# Patient Record
Sex: MALE | Race: WHITE | Employment: FULL TIME | URBAN - METROPOLITAN AREA
[De-identification: names, ages, dates, MRNs, and addresses within clinical notes are randomized per-mention and may not be internally consistent; named-entity substitution may affect disease eponyms.]

---

## 2017-10-09 ENCOUNTER — HOSPITAL ENCOUNTER (OUTPATIENT)
Dept: PREADMISSION TESTING | Age: 62
Discharge: HOME | End: 2017-10-09
Payer: COMMERCIAL

## 2017-10-09 LAB
ANION GAP SERPL CALC-SCNC: 6 MMOL/L (ref 3–18)
BUN SERPL-MCNC: 10 MG/DL (ref 7–18)
BUN/CREAT SERPL: 9 (ref 12–20)
CALCIUM SERPL-MCNC: 8.6 MG/DL (ref 8.5–10.1)
CHLORIDE SERPL-SCNC: 107 MMOL/L (ref 100–108)
CO2 SERPL-SCNC: 28 MMOL/L (ref 21–32)
CREAT SERPL-MCNC: 1.13 MG/DL (ref 0.6–1.3)
GLUCOSE SERPL-MCNC: 104 MG/DL (ref 74–99)
HCT VFR BLD AUTO: 43.5 % (ref 36–48)
HGB BLD-MCNC: 14.5 G/DL (ref 13–16)
POTASSIUM SERPL-SCNC: 3.7 MMOL/L (ref 3.5–5.5)
SODIUM SERPL-SCNC: 141 MMOL/L (ref 136–145)

## 2017-10-09 PROCEDURE — 85018 HEMOGLOBIN: CPT | Performed by: INTERNAL MEDICINE

## 2017-10-09 PROCEDURE — 36415 COLL VENOUS BLD VENIPUNCTURE: CPT | Performed by: INTERNAL MEDICINE

## 2017-10-09 PROCEDURE — 80048 BASIC METABOLIC PNL TOTAL CA: CPT | Performed by: INTERNAL MEDICINE

## 2017-10-11 RX ORDER — ATROPINE SULFATE 0.1 MG/ML
0.5 INJECTION INTRAVENOUS
Status: CANCELLED | OUTPATIENT
Start: 2017-10-11 | End: 2017-10-11

## 2017-10-11 RX ORDER — SODIUM CHLORIDE 0.9 % (FLUSH) 0.9 %
5-10 SYRINGE (ML) INJECTION EVERY 8 HOURS
Status: CANCELLED | OUTPATIENT
Start: 2017-10-11 | End: 2017-10-11

## 2017-10-11 RX ORDER — EPINEPHRINE 0.1 MG/ML
1 INJECTION INTRACARDIAC; INTRAVENOUS
Status: CANCELLED | OUTPATIENT
Start: 2017-10-11 | End: 2017-10-11

## 2017-10-11 RX ORDER — SODIUM CHLORIDE 0.9 % (FLUSH) 0.9 %
5-10 SYRINGE (ML) INJECTION AS NEEDED
Status: CANCELLED | OUTPATIENT
Start: 2017-10-11 | End: 2017-10-11

## 2017-10-13 ENCOUNTER — HOSPITAL ENCOUNTER (OUTPATIENT)
Age: 62
Setting detail: OUTPATIENT SURGERY
Discharge: HOME | End: 2017-10-13
Attending: SURGERY | Admitting: SURGERY
Payer: COMMERCIAL

## 2017-10-13 VITALS
DIASTOLIC BLOOD PRESSURE: 83 MMHG | HEIGHT: 70 IN | TEMPERATURE: 96.5 F | WEIGHT: 247 LBS | RESPIRATION RATE: 16 BRPM | HEART RATE: 71 BPM | SYSTOLIC BLOOD PRESSURE: 143 MMHG | OXYGEN SATURATION: 94 % | BODY MASS INDEX: 35.36 KG/M2

## 2017-10-13 PROCEDURE — 77030020256 HC SOL INJ NACL 0.9%  500ML: Performed by: SURGERY

## 2017-10-13 PROCEDURE — 74011250636 HC RX REV CODE- 250/636

## 2017-10-13 PROCEDURE — 88305 TISSUE EXAM BY PATHOLOGIST: CPT | Performed by: SURGERY

## 2017-10-13 PROCEDURE — 76040000019: Performed by: SURGERY

## 2017-10-13 PROCEDURE — 77030013991 HC SNR POLYP ENDOSC BSC -A: Performed by: SURGERY

## 2017-10-13 PROCEDURE — G0500 MOD SEDAT ENDO SERVICE >5YRS: HCPCS | Performed by: SURGERY

## 2017-10-13 PROCEDURE — 74011250636 HC RX REV CODE- 250/636: Performed by: SURGERY

## 2017-10-13 RX ORDER — LIDOCAINE HYDROCHLORIDE 20 MG/ML
5 SOLUTION OROPHARYNGEAL ONCE
Status: DISCONTINUED | OUTPATIENT
Start: 2017-10-13 | End: 2017-10-13 | Stop reason: HOSPADM

## 2017-10-13 RX ORDER — FLUMAZENIL 0.1 MG/ML
0.2 INJECTION INTRAVENOUS
Status: DISCONTINUED | OUTPATIENT
Start: 2017-10-13 | End: 2017-10-13 | Stop reason: HOSPADM

## 2017-10-13 RX ORDER — SODIUM CHLORIDE 9 MG/ML
125 INJECTION, SOLUTION INTRAVENOUS CONTINUOUS
Status: DISCONTINUED | OUTPATIENT
Start: 2017-10-13 | End: 2017-10-13 | Stop reason: HOSPADM

## 2017-10-13 RX ORDER — FENTANYL CITRATE 50 UG/ML
100 INJECTION, SOLUTION INTRAMUSCULAR; INTRAVENOUS
Status: DISCONTINUED | OUTPATIENT
Start: 2017-10-13 | End: 2017-10-13 | Stop reason: HOSPADM

## 2017-10-13 RX ORDER — MIDAZOLAM HYDROCHLORIDE 1 MG/ML
5 INJECTION, SOLUTION INTRAMUSCULAR; INTRAVENOUS
Status: DISCONTINUED | OUTPATIENT
Start: 2017-10-13 | End: 2017-10-13 | Stop reason: HOSPADM

## 2017-10-13 RX ORDER — DEXTROMETHORPHAN/PSEUDOEPHED 2.5-7.5/.8
1.2 DROPS ORAL
Status: DISCONTINUED | OUTPATIENT
Start: 2017-10-13 | End: 2017-10-13 | Stop reason: HOSPADM

## 2017-10-13 RX ORDER — NALOXONE HYDROCHLORIDE 0.4 MG/ML
0.4 INJECTION, SOLUTION INTRAMUSCULAR; INTRAVENOUS; SUBCUTANEOUS
Status: DISCONTINUED | OUTPATIENT
Start: 2017-10-13 | End: 2017-10-13 | Stop reason: HOSPADM

## 2017-10-13 RX ADMIN — SODIUM CHLORIDE 125 ML/HR: 900 INJECTION, SOLUTION INTRAVENOUS at 11:21

## 2017-10-13 NOTE — DISCHARGE INSTRUCTIONS
Joe Vazquez  [id number]  1955    COLON DISCHARGE INSTRUCTIONS    Discomfort:  Redness at IV site- apply warm compress to area; if redness or soreness persist- contact your physician  There may be a slight amount of blood passed from the rectum  Gaseous discomfort- walking, belching will help relieve any discomfort  You should not operate a vehicle for 12 hours  You should not engage in an occupation involving machinery or appliances for rest of today  You may not drink alcoholic beverages for at least 12 hours  Avoid making any critical decisions for at least 24 hour  DIET:   High fiber diet. - however -  remember your colon is empty and a heavy meal will produce gas. Avoid these foods:  vegetables, fried / greasy foods, carbonated drinks for today     ACTIVITY:  You may resume your normal daily activities it is recommended that you spend the remainder of the day resting -  avoid any strenuous activity. CALL M.D. ANY SIGN OF:   Increasing pain, nausea, vomiting  Abdominal distension (swelling)  New increased bleeding (oral or rectal)  Fever (chills)  Pain in chest area  Bloody discharge from nose or mouth  Shortness of breath      Follow-up Instructions:   Dr Merlene Smith will call you in one to two weeks. If you do not hear from him, please call his office [phone].                           Joe Vazquez  [id number]  1955        DISCHARGE SUMMARY from Nurse    The following personal items collected during your admission are returned to you:   Dental Appliance: Dental Appliances: None  Vision: Visual Aid: Glasses  Hearing Aid:    Jewelry:    Clothing:    Other Valuables:    Valuables sent to safe:        DISCHARGE SUMMARY from Nurse    The following personal items are in your possession at time of discharge:    Dental Appliances: None  Visual Aid: Glasses                            PATIENT INSTRUCTIONS:    After general anesthesia or intravenous sedation, for 24 hours or while taking prescription Narcotics:  · Limit your activities  · Do not drive and operate hazardous machinery  · Do not make important personal or business decisions  · Do  not drink alcoholic beverages  · If you have not urinated within 8 hours after discharge, please contact your surgeon on call. Report the following to your surgeon:  · Excessive pain, swelling, redness or odor of or around the surgical area  · Temperature over 100.5  · Nausea and vomiting lasting longer than 4 hours or if unable to take medications  · Any signs of decreased circulation or nerve impairment to extremity: change in color, persistent  numbness, tingling, coldness or increase pain  · Any questions        What to do at Home:  Recommended activity: as above     If you experience any of the following symptoms as above, please follow up with Dr. Luke Johnson. *  Please give a list of your current medications to your Primary Care Provider. *  Please update this list whenever your medications are discontinued, doses are      changed, or new medications (including over-the-counter products) are added. *  Please carry medication information at all times in case of emergency situations. These are general instructions for a healthy lifestyle:    No smoking/ No tobacco products/ Avoid exposure to second hand smoke    Surgeon General's Warning:  Quitting smoking now greatly reduces serious risk to your health. Obesity, smoking, and sedentary lifestyle greatly increases your risk for illness    A healthy diet, regular physical exercise & weight monitoring are important for maintaining a healthy lifestyle    You may be retaining fluid if you have a history of heart failure or if you experience any of the following symptoms:  Weight gain of 3 pounds or more overnight or 5 pounds in a week, increased swelling in our hands or feet or shortness of breath while lying flat in bed.   Please call your doctor as soon as you notice any of these symptoms; do not wait until your next office visit. Recognize signs and symptoms of STROKE:    F-face looks uneven    A-arms unable to move or move unevenly    S-speech slurred or non-existent    T-time-call 911 as soon as signs and symptoms begin-DO NOT go       Back to bed or wait to see if you get better-TIME IS BRAIN. Warning Signs of HEART ATTACK     Call 911 if you have these symptoms:   Chest discomfort. Most heart attacks involve discomfort in the center of the chest that lasts more than a few minutes, or that goes away and comes back. It can feel like uncomfortable pressure, squeezing, fullness, or pain.  Discomfort in other areas of the upper body. Symptoms can include pain or discomfort in one or both arms, the back, neck, jaw, or stomach.  Shortness of breath with or without chest discomfort.  Other signs may include breaking out in a cold sweat, nausea, or lightheadedness. Don't wait more than five minutes to call 911 - MINUTES MATTER! Fast action can save your life. Calling 911 is almost always the fastest way to get lifesaving treatment. Emergency Medical Services staff can begin treatment when they arrive -- up to an hour sooner than if someone gets to the hospital by car. The discharge information has been reviewed with the patient and spouse. The patient and spouse verbalized understanding. Discharge medications reviewed with the patient and spouse and appropriate educational materials and side effects teaching were provided.     Patient armband removed and shredded

## 2017-10-13 NOTE — IP AVS SNAPSHOT
Monicataylorne Paramjit 
 
 
 [address] [address] 
[phone] Patient: Bindu Guzman 
MRN: [id number] DSX:[id number]/11/3492 You are allergic to the following No active allergies Recent Documentation Height Weight BMI Smoking Status 1.778 m 112 kg 35.44 kg/m2 Never Smoker Emergency Contacts Name Discharge Info Relation Home Work Mobile [phone] CAREGIVER [3] Spouse [3] [phone] About your hospitalization You were admitted on:  October 13, 2017 You last received care in the:  [facility] ENDOSCOPY You were discharged on:  October 13, 2017 Unit phone number:  [phone] Why you were hospitalized Your primary diagnosis was:  Not on File Providers Seen During Your Hospitalizations Provider Role Specialty Primary office phone Vicenta Cheney MD Attending Provider General Surgery [phone] Your Primary Care Physician (PCP) Primary Care Physician Office Phone Office Fax Evie Elton [phone] Follow-up Information Follow up With Details Comments Contact Info Pricilla Mckenna, [address] [facility] [address] [place] [address] 
[phone] Your Appointments Wednesday October 25, 2017 [facility] LAPAROSCOP[facility] with Vicenta Cheney MD  
[facility] SURGERY [facility]) [address] [address] [phone] Current Discharge Medication List  
  
CONTINUE these medications which have NOT CHANGED Dose & Instructions Dispensing Information Comments Morning Noon Evening Bedtime  
 aspirin delayed-release 81 mg tablet Your last dose was: Your next dose is:    
   
   
 Dose:  81 mg Take 81 mg by mouth every evening. Refills:  0 PLAVIX 75 mg Tab Generic drug:  clopidogrel Your last dose was: Your next dose is:    
   
   
 Dose:  75 mg Take 75 mg by mouth every evening. Refills:  0  
     
   
   
   
  
 ramipril 5 mg capsule Commonly known as:  ALTACE Your last dose was: Your next dose is:    
   
   
 Dose:  5 mg Take 5 mg by mouth every evening. Refills:  0 ZOCOR 40 mg tablet Generic drug:  simvastatin Your last dose was: Your next dose is:    
   
   
 Dose:  40 mg Take 40 mg by mouth nightly. Refills:  0 Discharge Instructions Balbir Gerardo 
[id number] 
1955 COLON DISCHARGE INSTRUCTIONS Discomfort: 
Redness at IV site- apply warm compress to area; if redness or soreness persist- contact your physician There may be a slight amount of blood passed from the rectum Gaseous discomfort- walking, belching will help relieve any discomfort You should not operate a vehicle for 12 hours You should not engage in an occupation involving machinery or appliances for rest of today You may not drink alcoholic beverages for at least 12 hours Avoid making any critical decisions for at least 24 hour DIET: 
 High fiber diet.  however -  remember your colon is empty and a heavy meal will produce gas. Avoid these foods:  vegetables, fried / greasy foods, carbonated drinks for today ACTIVITY: 
You may resume your normal daily activities it is recommended that you spend the remainder of the day resting -  avoid any strenuous activity. CALL M.D. ANY SIGN OF: Increasing pain, nausea, vomiting Abdominal distension (swelling) New increased bleeding (oral or rectal) Fever (chills) Pain in chest area Bloody discharge from nose or mouth Shortness of breath Follow-up Instructions: 
 Dr Sofía Bowman will call you in one to two weeks. If you do not hear from him, please call his office [phone]. Balbir Gerardo 
[id number] 1955 DISCHARGE SUMMARY from Nurse The following personal items collected during your admission are returned to you:  
Dental Appliance: Dental Appliances: None Vision: Visual Aid: Glasses Hearing Aid:   
Jewelry:   
Clothing:   
Other Valuables:   
Valuables sent to safe:   
 
 
DISCHARGE SUMMARY from Nurse The following personal items are in your possession at time of discharge: 
 
Dental Appliances: None Visual Aid: Glasses PATIENT INSTRUCTIONS: 
 
 
F-face looks uneven A-arms unable to move or move unevenly S-speech slurred or non-existent T-time-call 911 as soon as signs and symptoms begin-DO NOT go Back to bed or wait to see if you get better-TIME IS BRAIN. Warning Signs of HEART ATTACK Call 911 if you have these symptoms: 
? Chest discomfort. Most heart attacks involve discomfort in the center of the chest that lasts more than a few minutes, or that goes away and comes back. It can feel like uncomfortable pressure, squeezing, fullness, or pain. ? Discomfort in other areas of the upper body. Symptoms can include pain or discomfort in one or both arms, the back, neck, jaw, or stomach. ? Shortness of breath with or without chest discomfort. ? Other signs may include breaking out in a cold sweat, nausea, or lightheadedness. Don't wait more than five minutes to call [address]! Fast action can save your life. Calling 911 is almost always the fastest way to get lifesaving treatment. Emergency Medical Services staff can begin treatment when they arrive  up to an hour sooner than if someone gets to the hospital by car. The discharge information has been reviewed with the patient and spouse. The patient and spouse verbalized understanding. Discharge medications reviewed with the patient and spouse and appropriate educational materials and side effects teaching were provided. Patient armband removed and shredded Discharge Orders None Introducing [facility]! Navarro Mcintyre introduces [employer] patient portal. Now you can access parts of your medical record, email your doctor's office, and request medication refills online. 1. In your internet browser, go to [email or web address] 2. Click on the First Time User? Click Here link in the Sign In box. You will see the New Member Sign Up page. 3. Enter your [employer] Access Code exactly as it appears below. You will not need to use this code after youve completed the sign-up process. If you do not sign up before the expiration date, you must request a new code. · [employer] Access Code: [email or web address] Expires: 12/25/2017 12:17 PM 
 
4. Enter the last four digits of your Social Security Number (xxxx) and Date of Birth (mm/dd/yyyy) as indicated and click Submit. You will be taken to the next sign-up page. 5. Create a [employer] ID. This will be your [employer] login ID and cannot be changed, so think of one that is secure and easy to remember. 6. Create a [employer] password. You can change your password at any time. 7. Enter your Password Reset Question and Answer. This can be used at a later time if you forget your password. 8. Enter your e-mail address. You will receive e-mail notification when new information is available in [address]. 9. Click Sign Up. You can now view and download portions of your medical record. 10. Click the Download Summary menu link to download a portable copy of your medical information. If you have questions, please visit the Frequently Asked Questions section of the [employer] website. Remember, [employer] is NOT to be used for urgent needs. For medical emergencies, dial 911. Now available from your iPhone and Android! General Information Please provide this summary of care documentation to your next provider. Patient Signature:  ____________________________________________________________ Date:  ____________________________________________________________  
  
Conner Mais Provider Signature:  ____________________________________________________________ Date:  ____________________________________________________________

## 2017-10-13 NOTE — H&P
Assessment/Plan  # Detail Type Description    1. Assessment Abdominal pain (R10.9). Patient Plan Pain could be relate dto umbilical hernia. But he has tenderness in LLQ also. I am concerned about diverticultits. He will need a CT. Discussed cscope, which he never has had. His father had colon CA. 2. Assessment Umbilical hernia without obstruction and without gangrene (K42.9). Patient Plan Discussed. I have discussed the risks benefits and alternatives of the procedure to the patient including bleeding, infection, use of mesh, post op pain, reason and side effects of nerve resections,recurrence. They understand and wish to proceed after CT. 3. Assessment Encounter for screening for malignant neoplasm of colon (Z12.11). Patient Plan  I have discussed the risks, benefits and alternatives of the procedure to the patient including bleeding, infection, bowel prep, perforation missed lesions and incomplete procedure. They understand and wish to proceed. 4. Other Orders Orders not associated to today's assessments. Plan Orders BUN AU to be performed and Creatinine, Serum AU to be performed. Plan Orders Further diagnostic evaluations ordered today include(s) CT Abdomen And Pelvis W/ DYE to be performed. This 58year old male presents for Hernia, Abdominal Discomfort and Colon Cancer Screen. History of Present Illness:  1. Hernia      Duration: day. Severity: 8. The problem is improving., LLQ and periumbilical  The patient describes it as dull and sharp. Identified risk factors include history of hernias, obesity, overweight and smoking. Symptom is aggravated by pressure to abdomen.   Pertinent negatives include abdominal distention, anorexia, back pain, bloating, blood in stool, constipation, cough, diaphoresis, diarrhea, dizziness, dyspnea, epigastric pain, eructation, fatigue, fever, flank pain, flatulence, heartburn, hematuria, jaundice, lightheadedness, menstruation, milk/dairy intolerance, myalgia, nausea, post prandial fullness, reflux, scrotal swelling, vomiting, weight gain and weight loss. Additional information: Known umbilical hernia, now with pain since Sat. Also has pain in LLQ, no previous cscope and has FH colon CA. .      2.  Abdominal Discomfort      Additional information: LLQ pain. No previous cscope. 3.  Colon Cancer Screen      No prior screening. Risk Factors: family history - parent and Father. Associated symptoms include abdominal pain. Pertinent negatives include change in bowel habits, change in stool caliber, constipation, decreased appetite, diarrhea, melena, nausea, rectal bleeding, vomiting, weight gain and weight loss. Additional information: No family history of Crohn's/colitis and Patient has family history of colon cancer. PROBLEM LIST:  Problem Description Onset Date   Hypogonadism 2015   Hemorrhoids 2015   Hyperlipidemia 2014   Coronary atherosclerosis 2014   Benign essential hypertension 2014       PAST MEDICAL/SURGICAL HISTORY  (Detailed)    Disease/disorder Onset Date Management Date Comments     cardiac catheterization       oral surgery       Family History:  (Detailed)  Relationship Family Member Name  Age at Death Condition Onset Age Cause of Death       Family history of Asthma  N       Family history of Diabetes mellitus type 2  N   Father    Cancer, colon  N   Mother    Blood clots, lung  N       Social History:  (Detailed)  Tobacco use reviewed. Preferred language is [place]. MARITAL STATUS/FAMILY/SOCIAL SUPPORT  Currently . Tobacco use status: Never smoked tobacco.  Smoking status: Never smoker. SMOKING STATUS  Use Status Type Smoking Status Usage Per Day Years Used Total Pack Years   no/never  Never smoker          ALCOHOL  There is no history of alcohol use. CAFFEINE  The patient uses caffeine: tea and coffee.  - 48oz a day.        Medication Reconciliation  Medications reconciled today. Medication Reviewed  Adherence Medication Name Sig Desc Elsewhere Status   taking as directed Zocor 40 mg tablet TAKE ONE TABLET BY MOUTH ONCE DAILY IN THE EVENING in the evening N Verified   taking as directed Altace 5 mg capsule TAKE ONE CAPSULE BY MOUTH ONCE DAILY N Verified   taking as directed Plavix 75 mg tablet TAKE ONE TABLET BY MOUTH ONCE DAILY N Verified   taking as directed aspirin 81 mg tablet,delayed release take 1 tablet by oral route  every day N Verified     Allergies:  Ingredient Reaction Medication Name Comment   NO KNOWN DRUG ALLERGIES      (Reviewed, no changes.)  Review of Systems  System Neg/Pos Details   Constitutional Negative Fatigue, fever, night sweats, weight gain and weight loss. ENMT Negative Hearing loss, tinnitus, vertigo and voice change. Eyes Negative Diplopia and vision loss. Respiratory Negative Asthma, cough, dyspnea, hemoptysis, known TB exposure and wheezing. Cardio Negative Chest pain, claudication, edema, irregular heartbeat/palpitations and thrombophlebitis. GI Positive Abdominal pain. GI Negative Abdominal distention, anorexia, bloating, blood in stool, change in bowel habits, change in stool caliber, constipation, decreased appetite, diarrhea, dysphagia, epigastric pain, eructation, flatulence, heartburn, hemorrhoids, jaundice, melena, milk/diary intolerance, nausea, post prandial fullness, rectal bleeding, reflux and vomiting.  Negative Back pain, dysuria, flank pain, hematuria, menstruation, nocturia, passage stone/gravel, scrotal swelling and urinary incontinence. Endocrine Negative Cold intolerance, diaphoresis and goiter. Neuro Negative Dizziness, focal weakness, headache, lightheadedness, paresthesia, seizures and syncope. Integumentary Negative Change in shape/size of mole(s) and skin lesion. MS Negative Back pain, bone/joint symptoms, muscle weakness and myalgia. Hema/Lymph Negative Easy bleeding and easy bruising. Allergic/Immuno Negative Contact allergy and contact dermatitis. Vital Signs     Height  Time ft in cm Last Measured Height Position   8:54 AM 0.0 70.50 179.07 09/12/2017 0     Weight/BSA/BMI  Time lb oz kg Context BMI kg/m2 BSA m2   8:54 .00  115.212 dressed with shoes 35.93      Blood Pressure  Time BP mm/Hg Position Side Site Method Cuff Size   8:54 /80 sitting right arm automatic adult large     Temperature/Pulse/Respiration  Time Temp F Temp C Temp Site Pulse/min Pattern Resp/ min   8:54 AM 99.70 37.61 ear 63       Measured By  Time Measured by   8:54 AM Ne Givens       Physical Exam:  Exam Findings Details   Constitutional Normal Well developed. Eyes Normal Conjunctiva - Right: Normal, Left: Normal. Pupil - Right: Normal, Left: Normal.   Ears Normal Inspection - Right: Normal, Left: Normal. Hearing - Right: Normal, Left: Normal.   Nose/Mouth/Throat Normal External nose - Normal. Lips/teeth/gums - Normal. Oropharynx - Normal.   Neck Exam Normal Inspection - Normal. Palpation - Normal. Thyroid gland - Normal.   Lymph Detail Normal No cervical, supraclavicular, or axillary adenopathy. Respiratory Normal Inspection - Normal. Auscultation - Normal. Effort - Normal.   Cardiovascular Normal Regular rate and rhythm. No murmurs, gallops, or rubs. Vascular Normal Pulses - Dorsalis pedis: Normal. Capillary refill - Less than 2 seconds. Abdomen * Umbilicus - herniated. Abdominal tenderness - periumbilical, LLQ. Abdomen Normal No hepatic enlargement. No spleen enlargement. No ascites. No palpable mass. Genitourinary Normal No hernia. Skin Normal Inspection - Normal.   Musculoskeletal Normal Visual overview of all four extremities is normal.   Extremity Normal No edema. Neurological Normal Memory - Normal. Cranial nerves - Cranial nerves II through XII grossly intact.    Psychiatric Normal Orientation - Oriented to time, place, person & situation. Appropriate mood and affect. Normal insight. Normal judgment. Medications (added, continued, or stopped this visit):  Started Medication Directions Instruction Stopped   06/08/2017 Altace 5 mg capsule TAKE ONE CAPSULE BY MOUTH ONCE DAILY     06/08/2017 aspirin 81 mg tablet,delayed release take 1 tablet by oral route  every day     06/08/2017 Plavix 75 mg tablet TAKE ONE TABLET BY MOUTH ONCE DAILY     06/08/2017 Zocor 40 mg tablet TAKE ONE TABLET BY MOUTH ONCE DAILY IN THE EVENING in the evening     To Be Scheduled / Ordered:  Status Order Reason Assessment Timeframe Appointment   ordered Creatinine, Serum AU  I10     ordered BUN AU  I10     ordered CT Abdomen And Pelvis W/ DYE  R10.32     Completed by: Lorenza Woodard 09/12/2017 9:16 AM   Document generated by:  Matthias Burch 09/12/2017 09:16 AM     -----------------------------------------------------------------------------------------------------------

## 2017-10-13 NOTE — OP NOTES
Colonoscopy Procedure Note    Indications: Routine screening/FH    Anesthesia/Sedation: Versed /fent mg IV    Pre-Procedure Exam:  Airway: clear   Heart: normal S1and S2    Lungs: clear bilateral  Abdomen: soft, nontender, bowel sounds present and normal in all quadrants   Mental Status: awake, alert, and oriented to person, place, and time      Procedure in Detail:  Informed consent was obtained for the procedure, including sedation. Risks of perforation, hemorrhage, adverse drug reaction, and aspiration were discussed. The patient was placed in the left lateral decubitus position. Based on the pre-procedure assessment, including review of the patient's medical history, medications, allergies, and review of systems, he had been deemed to be an appropriate candidate for moderate sedation; he was therefore sedated with the medications listed above. The patient was monitored continuously with ECG tracing, pulse oximetry, blood pressure monitoring, and direct observations. A rectal examination was performed. The [id number] was inserted into the rectum and advanced under direct vision to the cecum, which was identified by the ileocecal valve and appendiceal orifice. The quality of the colonic preparation was good. A careful inspection was made as the colonoscope was withdrawn, including a retroflexed view of the rectum; findings and interventions are described below. Appropriate photodocumentation was obtained. Findings:   Rectum:   Normal  Sigmoid:     - Protruding lesions:     -Sessile Polyp(s) size 2 mm removed by polypectomy (snare cautery)/40 cm  Descending Colon:     - Excavated lesions:     - Diverticulosis  Transverse Colon:   Normal  Ascending Colon:   Normal  Cecum:   Normal  Terminal Ileum:   Not entered        Specimens: Specimens were collected and sent to pathology. EBL: Minimal    Complications: None; patient tolerated the procedure well.     Attending Attestation: I performed the procedure. Recommendations:   - Await pathology.     Signed By: Alek Katz MD                       10/13/2017

## 2017-10-13 NOTE — INTERVAL H&P NOTE
H&P Update:  Alesia Gar was seen and examined. History and physical has been reviewed. The patient has been examined.  There have been no significant clinical changes since the completion of the originally dated History and Physical.    Signed By: Carlo Wadsworth MD     October 13, 2017 12:22 PM

## 2017-10-24 NOTE — H&P
Assessment/Plan  # Detail Type Description    1. Assessment Abdominal pain (R10.9). Patient Plan Pain could be relate dto umbilical hernia. But he has tenderness in LLQ also. I am concerned about diverticultits. He will need a CT. Discussed cscope, which he never has had. His father had colon CA. 2. Assessment Umbilical hernia without obstruction and without gangrene (K42.9). Patient Plan Discussed. I have discussed the risks benefits and alternatives of the procedure to the patient including bleeding, infection, use of mesh, post op pain, reason and side effects of nerve resections,recurrence. They understand and wish to proceed after CT. 3. Assessment Encounter for screening for malignant neoplasm of colon (Z12.11). Patient Plan  I have discussed the risks, benefits and alternatives of the procedure to the patient including bleeding, infection, bowel prep, perforation missed lesions and incomplete procedure. They understand and wish to proceed. 4. Other Orders Orders not associated to today's assessments. Plan Orders BUN AU to be performed and Creatinine, Serum AU to be performed. Plan Orders Further diagnostic evaluations ordered today include(s) CT Abdomen And Pelvis W/ DYE to be performed. This 58year old male presents for Hernia, Abdominal Discomfort and Colon Cancer Screen. History of Present Illness:  1. Hernia      Duration: day. Severity: 8. The problem is improving., LLQ and periumbilical  The patient describes it as dull and sharp. Identified risk factors include history of hernias, obesity, overweight and smoking. Symptom is aggravated by pressure to abdomen.   Pertinent negatives include abdominal distention, anorexia, back pain, bloating, blood in stool, constipation, cough, diaphoresis, diarrhea, dizziness, dyspnea, epigastric pain, eructation, fatigue, fever, flank pain, flatulence, heartburn, hematuria, jaundice, lightheadedness, menstruation, milk/dairy intolerance, myalgia, nausea, post prandial fullness, reflux, scrotal swelling, vomiting, weight gain and weight loss. Additional information: Known umbilical hernia, now with pain since Sat. Also has pain in LLQ, no previous cscope and has FH colon CA. .      2.  Abdominal Discomfort      Additional information: LLQ pain. No previous cscope. 3.  Colon Cancer Screen      No prior screening. Risk Factors: family history - parent and Father. Associated symptoms include abdominal pain. Pertinent negatives include change in bowel habits, change in stool caliber, constipation, decreased appetite, diarrhea, melena, nausea, rectal bleeding, vomiting, weight gain and weight loss. Additional information: No family history of Crohn's/colitis and Patient has family history of colon cancer. PROBLEM LIST:  Problem Description Onset Date   Hypogonadism 2015   Hemorrhoids 2015   Hyperlipidemia 2014   Coronary atherosclerosis 2014   Benign essential hypertension 2014       PAST MEDICAL/SURGICAL HISTORY  (Detailed)    Disease/disorder Onset Date Management Date Comments     cardiac catheterization       oral surgery       Family History:  (Detailed)  Relationship Family Member Name  Age at Death Condition Onset Age Cause of Death       Family history of Asthma  N       Family history of Diabetes mellitus type 2  N   Father    Cancer, colon  N   Mother    Blood clots, lung  N       Social History:  (Detailed)  Tobacco use reviewed. Preferred language is [place]. MARITAL STATUS/FAMILY/SOCIAL SUPPORT  Currently . Tobacco use status: Never smoked tobacco.  Smoking status: Never smoker. SMOKING STATUS  Use Status Type Smoking Status Usage Per Day Years Used Total Pack Years   no/never  Never smoker          ALCOHOL  There is no history of alcohol use. CAFFEINE  The patient uses caffeine: tea and coffee.  - 48oz a day.        Medication Reconciliation  Medications reconciled today. Medication Reviewed  Adherence Medication Name Sig Desc Elsewhere Status   taking as directed Zocor 40 mg tablet TAKE ONE TABLET BY MOUTH ONCE DAILY IN THE EVENING in the evening N Verified   taking as directed Altace 5 mg capsule TAKE ONE CAPSULE BY MOUTH ONCE DAILY N Verified   taking as directed Plavix 75 mg tablet TAKE ONE TABLET BY MOUTH ONCE DAILY N Verified   taking as directed aspirin 81 mg tablet,delayed release take 1 tablet by oral route  every day N Verified     Allergies:  Ingredient Reaction Medication Name Comment   NO KNOWN DRUG ALLERGIES      (Reviewed, no changes.)  Review of Systems  System Neg/Pos Details   Constitutional Negative Fatigue, fever, night sweats, weight gain and weight loss. ENMT Negative Hearing loss, tinnitus, vertigo and voice change. Eyes Negative Diplopia and vision loss. Respiratory Negative Asthma, cough, dyspnea, hemoptysis, known TB exposure and wheezing. Cardio Negative Chest pain, claudication, edema, irregular heartbeat/palpitations and thrombophlebitis. GI Positive Abdominal pain. GI Negative Abdominal distention, anorexia, bloating, blood in stool, change in bowel habits, change in stool caliber, constipation, decreased appetite, diarrhea, dysphagia, epigastric pain, eructation, flatulence, heartburn, hemorrhoids, jaundice, melena, milk/diary intolerance, nausea, post prandial fullness, rectal bleeding, reflux and vomiting.  Negative Back pain, dysuria, flank pain, hematuria, menstruation, nocturia, passage stone/gravel, scrotal swelling and urinary incontinence. Endocrine Negative Cold intolerance, diaphoresis and goiter. Neuro Negative Dizziness, focal weakness, headache, lightheadedness, paresthesia, seizures and syncope. Integumentary Negative Change in shape/size of mole(s) and skin lesion. MS Negative Back pain, bone/joint symptoms, muscle weakness and myalgia. Hema/Lymph Negative Easy bleeding and easy bruising. Allergic/Immuno Negative Contact allergy and contact dermatitis. Vital Signs     Height  Time ft in cm Last Measured Height Position   8:54 AM 0.0 70.50 179.07 09/12/2017 0     Weight/BSA/BMI  Time lb oz kg Context BMI kg/m2 BSA m2   8:54 .00  115.212 dressed with shoes 35.93      Blood Pressure  Time BP mm/Hg Position Side Site Method Cuff Size   8:54 /80 sitting right arm automatic adult large     Temperature/Pulse/Respiration  Time Temp F Temp C Temp Site Pulse/min Pattern Resp/ min   8:54 AM 99.70 37.61 ear 63       Measured By  Time Measured by   8:54 AM Lissa Cordova       Physical Exam:  Exam Findings Details   Constitutional Normal Well developed. Eyes Normal Conjunctiva - Right: Normal, Left: Normal. Pupil - Right: Normal, Left: Normal.   Ears Normal Inspection - Right: Normal, Left: Normal. Hearing - Right: Normal, Left: Normal.   Nose/Mouth/Throat Normal External nose - Normal. Lips/teeth/gums - Normal. Oropharynx - Normal.   Neck Exam Normal Inspection - Normal. Palpation - Normal. Thyroid gland - Normal.   Lymph Detail Normal No cervical, supraclavicular, or axillary adenopathy. Respiratory Normal Inspection - Normal. Auscultation - Normal. Effort - Normal.   Cardiovascular Normal Regular rate and rhythm. No murmurs, gallops, or rubs. Vascular Normal Pulses - Dorsalis pedis: Normal. Capillary refill - Less than 2 seconds. Abdomen * Umbilicus - herniated. Abdominal tenderness - periumbilical, LLQ. Abdomen Normal No hepatic enlargement. No spleen enlargement. No ascites. No palpable mass. Genitourinary Normal No hernia. Skin Normal Inspection - Normal.   Musculoskeletal Normal Visual overview of all four extremities is normal.   Extremity Normal No edema. Neurological Normal Memory - Normal. Cranial nerves - Cranial nerves II through XII grossly intact.    Psychiatric Normal Orientation - Oriented to time, place, person & situation. Appropriate mood and affect. Normal insight. Normal judgment. Medications (added, continued, or stopped this visit):  Started Medication Directions Instruction Stopped   06/08/2017 Altace 5 mg capsule TAKE ONE CAPSULE BY MOUTH ONCE DAILY     06/08/2017 aspirin 81 mg tablet,delayed release take 1 tablet by oral route  every day     06/08/2017 Plavix 75 mg tablet TAKE ONE TABLET BY MOUTH ONCE DAILY     06/08/2017 Zocor 40 mg tablet TAKE ONE TABLET BY MOUTH ONCE DAILY IN THE EVENING in the evening     To Be Scheduled / Ordered:  Status Order Reason Assessment Timeframe Appointment   ordered Creatinine, Serum AU  I10     ordered BUN AU  I10     ordered CT Abdomen And Pelvis W/ DYE  R10.32     Completed by: Shana Pretty 09/12/2017 9:16 AM   Document generated by:  Torsten Burch 09/12/2017 09:16 AM     -----------------------------------------------------------------------------------------------------------

## 2017-10-25 ENCOUNTER — ANESTHESIA (OUTPATIENT)
Dept: SURGERY | Age: 62
End: 2017-10-25
Payer: COMMERCIAL

## 2017-10-25 ENCOUNTER — HOSPITAL ENCOUNTER (OUTPATIENT)
Age: 62
Setting detail: OUTPATIENT SURGERY
Discharge: HOME | End: 2017-10-25
Attending: SURGERY | Admitting: SURGERY
Payer: COMMERCIAL

## 2017-10-25 ENCOUNTER — ANESTHESIA EVENT (OUTPATIENT)
Dept: SURGERY | Age: 62
End: 2017-10-25
Payer: COMMERCIAL

## 2017-10-25 VITALS
OXYGEN SATURATION: 96 % | BODY MASS INDEX: 36.15 KG/M2 | TEMPERATURE: 97.5 F | WEIGHT: 252.5 LBS | HEIGHT: 70 IN | HEART RATE: 63 BPM | RESPIRATION RATE: 16 BRPM | DIASTOLIC BLOOD PRESSURE: 67 MMHG | SYSTOLIC BLOOD PRESSURE: 122 MMHG

## 2017-10-25 PROCEDURE — 77030008518 HC TBNG INSUF ENDO STRY -B: Performed by: SURGERY

## 2017-10-25 PROCEDURE — 74011250636 HC RX REV CODE- 250/636: Performed by: INTERNAL MEDICINE

## 2017-10-25 PROCEDURE — 77030033200 HC PRT CLSR CRTR THOMP COOP -C: Performed by: SURGERY

## 2017-10-25 PROCEDURE — 77030010507 HC ADH SKN DERMBND J&J -B: Performed by: SURGERY

## 2017-10-25 PROCEDURE — 77030008603 HC TRCR ENDOSC EPATH J&J -C: Performed by: SURGERY

## 2017-10-25 PROCEDURE — 77030006643: Performed by: ANESTHESIOLOGY

## 2017-10-25 PROCEDURE — 77030008683 HC TU ET CUF COVD -A: Performed by: ANESTHESIOLOGY

## 2017-10-25 PROCEDURE — 77030032230 HC DSCTR ULTSONC CRDLSS COVD -E: Performed by: SURGERY

## 2017-10-25 PROCEDURE — 74011250636 HC RX REV CODE- 250/636

## 2017-10-25 PROCEDURE — 74011000250 HC RX REV CODE- 250

## 2017-10-25 PROCEDURE — C1781 MESH (IMPLANTABLE): HCPCS | Performed by: SURGERY

## 2017-10-25 PROCEDURE — 77030016151 HC PROTCTR LNS DFOG COVD -B: Performed by: SURGERY

## 2017-10-25 PROCEDURE — 77030008477 HC STYL SATN SLP COVD -A: Performed by: ANESTHESIOLOGY

## 2017-10-25 PROCEDURE — 77030008602 HC TRCR ENDOSC EPATH J&J -B: Performed by: SURGERY

## 2017-10-25 PROCEDURE — 77030034972 HC STPLR ARTC RELTACK3 COVD -F: Performed by: SURGERY

## 2017-10-25 PROCEDURE — 76060000033 HC ANESTHESIA 1 TO 1.5 HR: Performed by: SURGERY

## 2017-10-25 PROCEDURE — 76210000021 HC REC RM PH II 0.5 TO 1 HR: Performed by: SURGERY

## 2017-10-25 PROCEDURE — 74011000250 HC RX REV CODE- 250: Performed by: SURGERY

## 2017-10-25 PROCEDURE — 76210000006 HC OR PH I REC 0.5 TO 1 HR: Performed by: SURGERY

## 2017-10-25 PROCEDURE — 77030002986 HC SUT PROL J&J -A: Performed by: SURGERY

## 2017-10-25 PROCEDURE — 76010000149 HC OR TIME 1 TO 1.5 HR: Performed by: SURGERY

## 2017-10-25 PROCEDURE — 77030002966 HC SUT PDS J&J -A: Performed by: SURGERY

## 2017-10-25 PROCEDURE — 77030032490 HC SLV COMPR SCD KNE COVD -B: Performed by: SURGERY

## 2017-10-25 PROCEDURE — 77030011265 HC ELECTRD BLD HEX COVD -A: Performed by: SURGERY

## 2017-10-25 PROCEDURE — 77030003580 HC NDL INSUF VERES J&J -B: Performed by: SURGERY

## 2017-10-25 PROCEDURE — 74011000258 HC RX REV CODE- 258: Performed by: INTERNAL MEDICINE

## 2017-10-25 PROCEDURE — 77030031139 HC SUT VCRL2 J&J -A: Performed by: SURGERY

## 2017-10-25 PROCEDURE — 77030002935 HC SUT MCRYL J&J -C: Performed by: SURGERY

## 2017-10-25 PROCEDURE — 77030020782 HC GWN BAIR PAWS FLX 3M -B: Performed by: SURGERY

## 2017-10-25 DEVICE — MESH SURG DIA12CM WHT POLY CLLGN FLM RND MFIL BIOABSRB: Type: IMPLANTABLE DEVICE | Site: ABDOMEN | Status: FUNCTIONAL

## 2017-10-25 RX ORDER — LIDOCAINE HYDROCHLORIDE 20 MG/ML
INJECTION, SOLUTION EPIDURAL; INFILTRATION; INTRACAUDAL; PERINEURAL AS NEEDED
Status: DISCONTINUED | OUTPATIENT
Start: 2017-10-25 | End: 2017-10-25 | Stop reason: HOSPADM

## 2017-10-25 RX ORDER — ONDANSETRON 2 MG/ML
4 INJECTION INTRAMUSCULAR; INTRAVENOUS ONCE
Status: DISCONTINUED | OUTPATIENT
Start: 2017-10-25 | End: 2017-10-25 | Stop reason: HOSPADM

## 2017-10-25 RX ORDER — OXYCODONE AND ACETAMINOPHEN 5; 325 MG/1; MG/1
1 TABLET ORAL AS NEEDED
Status: DISCONTINUED | OUTPATIENT
Start: 2017-10-25 | End: 2017-10-25 | Stop reason: HOSPADM

## 2017-10-25 RX ORDER — KETOROLAC TROMETHAMINE 30 MG/ML
INJECTION, SOLUTION INTRAMUSCULAR; INTRAVENOUS AS NEEDED
Status: DISCONTINUED | OUTPATIENT
Start: 2017-10-25 | End: 2017-10-25 | Stop reason: HOSPADM

## 2017-10-25 RX ORDER — INSULIN LISPRO 100 [IU]/ML
INJECTION, SOLUTION INTRAVENOUS; SUBCUTANEOUS ONCE
Status: CANCELLED | OUTPATIENT
Start: 2017-10-25 | End: 2017-10-25

## 2017-10-25 RX ORDER — OXYCODONE AND ACETAMINOPHEN 5; 325 MG/1; MG/1
1-2 TABLET ORAL
Qty: 30 TAB | Refills: 0 | Status: SHIPPED | OUTPATIENT
Start: 2017-10-25

## 2017-10-25 RX ORDER — ONDANSETRON 2 MG/ML
INJECTION INTRAMUSCULAR; INTRAVENOUS AS NEEDED
Status: DISCONTINUED | OUTPATIENT
Start: 2017-10-25 | End: 2017-10-25 | Stop reason: HOSPADM

## 2017-10-25 RX ORDER — NALOXONE HYDROCHLORIDE 0.4 MG/ML
0.1 INJECTION, SOLUTION INTRAMUSCULAR; INTRAVENOUS; SUBCUTANEOUS
Status: DISCONTINUED | OUTPATIENT
Start: 2017-10-25 | End: 2017-10-25 | Stop reason: HOSPADM

## 2017-10-25 RX ORDER — PROPOFOL 10 MG/ML
INJECTION, EMULSION INTRAVENOUS AS NEEDED
Status: DISCONTINUED | OUTPATIENT
Start: 2017-10-25 | End: 2017-10-25 | Stop reason: HOSPADM

## 2017-10-25 RX ORDER — MIDAZOLAM HYDROCHLORIDE 1 MG/ML
INJECTION, SOLUTION INTRAMUSCULAR; INTRAVENOUS AS NEEDED
Status: DISCONTINUED | OUTPATIENT
Start: 2017-10-25 | End: 2017-10-25 | Stop reason: HOSPADM

## 2017-10-25 RX ORDER — MAGNESIUM SULFATE 100 %
4 CRYSTALS MISCELLANEOUS AS NEEDED
Status: CANCELLED | OUTPATIENT
Start: 2017-10-25

## 2017-10-25 RX ORDER — FENTANYL CITRATE 50 UG/ML
25 INJECTION, SOLUTION INTRAMUSCULAR; INTRAVENOUS
Status: ACTIVE | OUTPATIENT
Start: 2017-10-25 | End: 2017-10-25

## 2017-10-25 RX ORDER — DEXAMETHASONE SODIUM PHOSPHATE 4 MG/ML
INJECTION, SOLUTION INTRA-ARTICULAR; INTRALESIONAL; INTRAMUSCULAR; INTRAVENOUS; SOFT TISSUE AS NEEDED
Status: DISCONTINUED | OUTPATIENT
Start: 2017-10-25 | End: 2017-10-25 | Stop reason: HOSPADM

## 2017-10-25 RX ORDER — DEXTROSE 50 % IN WATER (D50W) INTRAVENOUS SYRINGE
25-50 AS NEEDED
Status: CANCELLED | OUTPATIENT
Start: 2017-10-25

## 2017-10-25 RX ORDER — HYDROMORPHONE HYDROCHLORIDE 2 MG/ML
0.5 INJECTION, SOLUTION INTRAMUSCULAR; INTRAVENOUS; SUBCUTANEOUS
Status: DISCONTINUED | OUTPATIENT
Start: 2017-10-25 | End: 2017-10-25 | Stop reason: HOSPADM

## 2017-10-25 RX ORDER — SODIUM CHLORIDE, SODIUM LACTATE, POTASSIUM CHLORIDE, CALCIUM CHLORIDE 600; 310; 30; 20 MG/100ML; MG/100ML; MG/100ML; MG/100ML
125 INJECTION, SOLUTION INTRAVENOUS CONTINUOUS
Status: DISCONTINUED | OUTPATIENT
Start: 2017-10-25 | End: 2017-10-25 | Stop reason: HOSPADM

## 2017-10-25 RX ORDER — ROCURONIUM BROMIDE 10 MG/ML
INJECTION, SOLUTION INTRAVENOUS AS NEEDED
Status: DISCONTINUED | OUTPATIENT
Start: 2017-10-25 | End: 2017-10-25 | Stop reason: HOSPADM

## 2017-10-25 RX ORDER — FENTANYL CITRATE 50 UG/ML
INJECTION, SOLUTION INTRAMUSCULAR; INTRAVENOUS AS NEEDED
Status: DISCONTINUED | OUTPATIENT
Start: 2017-10-25 | End: 2017-10-25 | Stop reason: HOSPADM

## 2017-10-25 RX ORDER — SODIUM CHLORIDE, SODIUM LACTATE, POTASSIUM CHLORIDE, CALCIUM CHLORIDE 600; 310; 30; 20 MG/100ML; MG/100ML; MG/100ML; MG/100ML
50 INJECTION, SOLUTION INTRAVENOUS CONTINUOUS
Status: DISCONTINUED | OUTPATIENT
Start: 2017-10-25 | End: 2017-10-25 | Stop reason: HOSPADM

## 2017-10-25 RX ORDER — SODIUM CHLORIDE 0.9 % (FLUSH) 0.9 %
5-10 SYRINGE (ML) INJECTION AS NEEDED
Status: DISCONTINUED | OUTPATIENT
Start: 2017-10-25 | End: 2017-10-25 | Stop reason: HOSPADM

## 2017-10-25 RX ADMIN — FENTANYL CITRATE 50 MCG: 50 INJECTION, SOLUTION INTRAMUSCULAR; INTRAVENOUS at 12:11

## 2017-10-25 RX ADMIN — PROPOFOL 150 MG: 10 INJECTION, EMULSION INTRAVENOUS at 11:44

## 2017-10-25 RX ADMIN — DEXAMETHASONE SODIUM PHOSPHATE 4 MG: 4 INJECTION, SOLUTION INTRA-ARTICULAR; INTRALESIONAL; INTRAMUSCULAR; INTRAVENOUS; SOFT TISSUE at 12:28

## 2017-10-25 RX ADMIN — LIDOCAINE HYDROCHLORIDE 100 MG: 20 INJECTION, SOLUTION EPIDURAL; INFILTRATION; INTRACAUDAL; PERINEURAL at 11:44

## 2017-10-25 RX ADMIN — KETOROLAC TROMETHAMINE 30 MG: 30 INJECTION, SOLUTION INTRAMUSCULAR; INTRAVENOUS at 12:28

## 2017-10-25 RX ADMIN — ROCURONIUM BROMIDE 10 MG: 10 INJECTION, SOLUTION INTRAVENOUS at 12:27

## 2017-10-25 RX ADMIN — MIDAZOLAM HYDROCHLORIDE 2 MG: 1 INJECTION, SOLUTION INTRAMUSCULAR; INTRAVENOUS at 11:37

## 2017-10-25 RX ADMIN — SODIUM CHLORIDE, SODIUM LACTATE, POTASSIUM CHLORIDE, AND CALCIUM CHLORIDE 125 ML/HR: 600; 310; 30; 20 INJECTION, SOLUTION INTRAVENOUS at 08:42

## 2017-10-25 RX ADMIN — ONDANSETRON 4 MG: 2 INJECTION INTRAMUSCULAR; INTRAVENOUS at 12:28

## 2017-10-25 RX ADMIN — ROCURONIUM BROMIDE 40 MG: 10 INJECTION, SOLUTION INTRAVENOUS at 11:44

## 2017-10-25 RX ADMIN — FENTANYL CITRATE 25 MCG: 50 INJECTION, SOLUTION INTRAMUSCULAR; INTRAVENOUS at 12:40

## 2017-10-25 RX ADMIN — CEFOXITIN 2 G: 2 INJECTION, POWDER, FOR SOLUTION INTRAVENOUS at 11:41

## 2017-10-25 RX ADMIN — SODIUM CHLORIDE, SODIUM LACTATE, POTASSIUM CHLORIDE, AND CALCIUM CHLORIDE: 600; 310; 30; 20 INJECTION, SOLUTION INTRAVENOUS at 12:34

## 2017-10-25 RX ADMIN — FENTANYL CITRATE 50 MCG: 50 INJECTION, SOLUTION INTRAMUSCULAR; INTRAVENOUS at 11:44

## 2017-10-25 RX ADMIN — FENTANYL CITRATE 50 MCG: 50 INJECTION, SOLUTION INTRAMUSCULAR; INTRAVENOUS at 11:37

## 2017-10-25 RX ADMIN — FENTANYL CITRATE 50 MCG: 50 INJECTION, SOLUTION INTRAMUSCULAR; INTRAVENOUS at 12:23

## 2017-10-25 RX ADMIN — SODIUM CHLORIDE, SODIUM LACTATE, POTASSIUM CHLORIDE, AND CALCIUM CHLORIDE: 600; 310; 30; 20 INJECTION, SOLUTION INTRAVENOUS at 11:45

## 2017-10-25 NOTE — DISCHARGE INSTRUCTIONS
Post-Operative Discharge Instructions  Jemima Kim. Danton Cockayne, M.D.  [address] [place] [address] Angel Marie  [phone]    Patient: Basim Pike MRN: [id number]  CSN: [id number]    YOB: 1955  Age: 58 y.o. Sex: male    DOA: 10/25/2017 LOS:  LOS: 0 days   Discharge Date:      Acute Diagnoses:  INCARCERATED UMBILICAL HERNIA    Chronic Medical Diagnoses:  Problem List as of 10/25/2017  Date Reviewed: 10/25/2017    None          Diet  1. Resume prior to surgery diet as tolerated. Activity  1. Do not drive a car or operate any hazardous machinery the day of surgery. 2. Rest quietly today. 3. No bending or heavy lifting. 4. You may resume other prior to surgery activities as tolerated. 5. You may remove the bandage and shower in 1 day. Drain / Wound Care  1. Follow all drain / wound care instructions exactly as explained by the Nurse at time of discharge. 2. Apply an ice pack to the surgical site for 48 hours. 3. Do not put any salves or ointments on the wound. Allow it to form a dry scab. 4. Leave steri-strips / Dermabond alone. They should be allowed to fall off on their own in 7-14 days. Medications  1. It is important to take your medications exactly as they are prescribed. 2. Keep your medication in the bottles provided by the pharmacist, and keep a list of the medication names, dosages, and times they should be taken in your wallet. Call 911 anytime you think you may need emergency care. For example, call if:  · You passed out (lost consciousness). · You have severe trouble breathing. · You have sudden chest pain and shortness of breath. Notify your Surgeon for any of the followin. Fever, chills, nausea, vomiting, severe abdominal pain or bleeding. 2. If you experience any redness or discharge or sign of infection. 3. Persistent nausea lasting more than 24 hours.     If you are unable to reach your Surgeon for any of the symptoms above, you should proceed directly to the nearest Emergency Department. Post-Operative Appointment Information    Call Dr. Meenakshi Salgado office tomorrow morning at [phone] to schedule a post-operative office visit in one (1) week. If any questions or concerns arise, call your Surgeon at [phone]. DISCHARGE SUMMARY from Nurse    PATIENT INSTRUCTIONS:    After general anesthesia or intravenous sedation, for 24 hours or while taking prescription Narcotics:  · Limit your activities  · Do not drive and operate hazardous machinery  · Do not make important personal or business decisions  · Do  not drink alcoholic beverages  · If you have not urinated within 8 hours after discharge, please contact your surgeon on call. Report the following to your surgeon:  · Excessive pain, swelling, redness or odor of or around the surgical area  · Temperature over 100.5  · Nausea and vomiting lasting longer than 4 hours or if unable to take medications  · Any signs of decreased circulation or nerve impairment to extremity: change in color, persistent  numbness, tingling, coldness or increase pain  · Any questions    What to do at Home:  Recommended activity: Ambulate in house, No driving while on analgesics and No heavy lifting until advised     If you experience any of the following symptoms above, please follow up with Dr. Eagle Boss. *  Please give a list of your current medications to your Primary Care Provider. *  Please update this list whenever your medications are discontinued, doses are      changed, or new medications (including over-the-counter products) are added. *  Please carry medication information at all times in case of emergency situations. These are general instructions for a healthy lifestyle:    No smoking/ No tobacco products/ Avoid exposure to second hand smoke  Surgeon General's Warning:  Quitting smoking now greatly reduces serious risk to your health.     Obesity, smoking, and sedentary lifestyle greatly increases your risk for illness    A healthy diet, regular physical exercise & weight monitoring are important for maintaining a healthy lifestyle    You may be retaining fluid if you have a history of heart failure or if you experience any of the following symptoms:  Weight gain of 3 pounds or more overnight or 5 pounds in a week, increased swelling in our hands or feet or shortness of breath while lying flat in bed. Please call your doctor as soon as you notice any of these symptoms; do not wait until your next office visit. Recognize signs and symptoms of STROKE:    F-face looks uneven    A-arms unable to move or move unevenly    S-speech slurred or non-existent    T-time-call 911 as soon as signs and symptoms begin-DO NOT go       Back to bed or wait to see if you get better-TIME IS BRAIN. Warning Signs of HEART ATTACK     Call 911 if you have these symptoms:   Chest discomfort. Most heart attacks involve discomfort in the center of the chest that lasts more than a few minutes, or that goes away and comes back. It can feel like uncomfortable pressure, squeezing, fullness, or pain.  Discomfort in other areas of the upper body. Symptoms can include pain or discomfort in one or both arms, the back, neck, jaw, or stomach.  Shortness of breath with or without chest discomfort.  Other signs may include breaking out in a cold sweat, nausea, or lightheadedness. Don't wait more than five minutes to call 911 - MINUTES MATTER! Fast action can save your life. Calling 911 is almost always the fastest way to get lifesaving treatment. Emergency Medical Services staff can begin treatment when they arrive -- up to an hour sooner than if someone gets to the hospital by car. Patient armband removed and shredded    The discharge information has been reviewed with the patient and caregiver. The patient and caregiver verbalized understanding.   Discharge medications reviewed with the patient and caregiver and appropriate educational materials and side effects teaching were provided.   ___________________________________________________________________________________________________________________________________

## 2017-10-25 NOTE — ANESTHESIA PREPROCEDURE EVALUATION
Anesthetic History   No history of anesthetic complications            Review of Systems / Medical History  Patient summary reviewed, nursing notes reviewed and pertinent labs reviewed    Pulmonary  Within defined limits                 Neuro/Psych   Within defined limits           Cardiovascular    Hypertension          CAD         GI/Hepatic/Renal  Within defined limits              Endo/Other  Within defined limits           Other Findings              Physical Exam    Airway  Mallampati: II  TM Distance: 4 - 6 cm  Neck ROM: normal range of motion   Mouth opening: Normal     Cardiovascular  Regular rate and rhythm,  S1 and S2 normal,  no murmur, click, rub, or gallop             Dental  No notable dental hx       Pulmonary  Breath sounds clear to auscultation               Abdominal  Abdominal exam normal       Other Findings            Anesthetic Plan    ASA: 3  Anesthesia type: general          Induction: Intravenous  Anesthetic plan and risks discussed with: Family and Patient

## 2017-10-25 NOTE — INTERVAL H&P NOTE
H&P Update:  Debbie Melgar was seen and examined. History and physical has been reviewed. The patient has been examined.  There have been no significant clinical changes since the completion of the originally dated History and Physical.    Signed By: Radha Merritt MD     October 25, 2017 11:09 AM

## 2017-10-25 NOTE — IP AVS SNAPSHOT
[address] [address] 
[phone] Patient: Jane Meza 
MRN: [id number] WEQ:[id number] About your hospitalization You were admitted on:  October 25, 2017 You last received care in the:  [facility] PACU You were discharged on:  October 25, 2017 Why you were hospitalized Your primary diagnosis was:  Not on File Things You Need To Do (next 8 weeks) Follow up with Haroon Santos DO Phone:  [phone] Where:  [address] [facility], , [address] [facility], [facility] Follow up with Conner Carlin MD in 1 week(s) Phone:  [phone] Where:  [address] [facility], [address] [place] [address], [address] [facility] [address] Discharge Orders None A check rosa indicates which time of day the medication should be taken. My Medications TAKE these medications as instructed Instructions Each Dose to Equal  
 Morning Noon Evening Bedtime  
 aspirin delayed-release 81 mg tablet Your last dose was: Your next dose is: Take 81 mg by mouth every evening. 81 mg  
    
   
   
   
  
 oxyCODONE-acetaminophen 5-325 mg per tablet Commonly known as:  PERCOCET Your last dose was: Your next dose is: Take 1-2 Tabs by mouth every four (4) hours as needed for Pain. Max Daily Amount: 12 Tabs. 1-2 Tab PLAVIX 75 mg Tab Generic drug:  clopidogrel Your last dose was: Your next dose is: Take 75 mg by mouth every evening. 75 mg  
    
   
   
   
  
 ramipril 5 mg capsule Commonly known as:  ALTACE Your last dose was: Your next dose is: Take 5 mg by mouth every evening. 5 mg ZOCOR 40 mg tablet Generic drug:  simvastatin Your last dose was: Your next dose is: Take 40 mg by mouth nightly.   
 40 mg  
    
   
   
   
  
  
  
 Where to Get Your Medications Information on where to get these meds will be given to you by the nurse or doctor. ! Ask your nurse or doctor about these medications  
  oxyCODONE-acetaminophen 5-325 mg per tablet Discharge Instructions Post-Operative Discharge Instructions Mara Burch M.D. 
[address] [place] [address] 
[phone] Patient: Kristian Palomares MRN: [id number]  CSN: [id number] YOB: 1955  Age: 58 y.o. Sex: male DOA: 10/25/2017 LOS:  LOS: 0 days   Discharge Date:   
 
Acute Diagnoses: 
INCARCERATED UMBILICAL HERNIA Chronic Medical Diagnoses: 
Problem List as of 10/25/2017  Date Reviewed: 10/25/2017 None Diet 1. Resume prior to surgery diet as tolerated. Activity 1. Do not drive a car or operate any hazardous machinery the day of surgery. 2. Rest quietly today. 3. No bending or heavy lifting. 4. You may resume other prior to surgery activities as tolerated. 5. You may remove the bandage and shower in 1 day. Drain / Wound Care 1. Follow all drain / wound care instructions exactly as explained by the Nurse at time of discharge. 2. Apply an ice pack to the surgical site for 48 hours. 3. Do not put any salves or ointments on the wound. Allow it to form a dry scab. 4. Leave steri-strips / Dermabond alone. They should be allowed to fall off on their own in 7-14 days. Medications 1. It is important to take your medications exactly as they are prescribed. 2. Keep your medication in the bottles provided by the pharmacist, and keep a list of the medication names, dosages, and times they should be taken in your wallet. Call 911 anytime you think you may need emergency care. For example, call if: 
· You passed out (lost consciousness). · You have severe trouble breathing. · You have sudden chest pain and shortness of breath. Notify your Surgeon for any of the following: 1. Fever, chills, nausea, vomiting, severe abdominal pain or bleeding. 2. If you experience any redness or discharge or sign of infection. 3. Persistent nausea lasting more than 24 hours. If you are unable to reach your Surgeon for any of the symptoms above, you should proceed directly to the nearest Emergency Department. Post-Operative Appointment Information Call Dr. Iftikhar Frey office tomorrow morning at [phone] to schedule a post-operative office visit in one (1) week. If any questions or concerns arise, call your Surgeon at [phone]. DISCHARGE SUMMARY from Nurse PATIENT INSTRUCTIONS: 
 
After general anesthesia or intravenous sedation, for 24 hours or while taking prescription Narcotics: · Limit your activities · Do not drive and operate hazardous machinery · Do not make important personal or business decisions · Do  not drink alcoholic beverages · If you have not urinated within 8 hours after discharge, please contact your surgeon on call. Report the following to your surgeon: 
· Excessive pain, swelling, redness or odor of or around the surgical area · Temperature over 100.5 · Nausea and vomiting lasting longer than 4 hours or if unable to take medications · Any signs of decreased circulation or nerve impairment to extremity: change in color, persistent  numbness, tingling, coldness or increase pain · Any questions What to do at Home: 
Recommended activity: Ambulate in house, No driving while on analgesics and No heavy lifting until advised If you experience any of the following symptoms above, please follow up with Dr. Jan Culver. *  Please give a list of your current medications to your Primary Care Provider. *  Please update this list whenever your medications are discontinued, doses are 
    changed, or new medications (including over-the-counter products) are added.  
 
*  Please carry medication information at all times in case of emergency situations. These are general instructions for a healthy lifestyle: No smoking/ No tobacco products/ Avoid exposure to second hand smoke Surgeon General's Warning:  Quitting smoking now greatly reduces serious risk to your health. Obesity, smoking, and sedentary lifestyle greatly increases your risk for illness A healthy diet, regular physical exercise & weight monitoring are important for maintaining a healthy lifestyle You may be retaining fluid if you have a history of heart failure or if you experience any of the following symptoms:  Weight gain of 3 pounds or more overnight or 5 pounds in a week, increased swelling in our hands or feet or shortness of breath while lying flat in bed. Please call your doctor as soon as you notice any of these symptoms; do not wait until your next office visit. Recognize signs and symptoms of STROKE: 
 
F-face looks uneven A-arms unable to move or move unevenly S-speech slurred or non-existent T-time-call 911 as soon as signs and symptoms begin-DO NOT go Back to bed or wait to see if you get better-TIME IS BRAIN. Warning Signs of HEART ATTACK Call 911 if you have these symptoms: 
? Chest discomfort. Most heart attacks involve discomfort in the center of the chest that lasts more than a few minutes, or that goes away and comes back. It can feel like uncomfortable pressure, squeezing, fullness, or pain. ? Discomfort in other areas of the upper body. Symptoms can include pain or discomfort in one or both arms, the back, neck, jaw, or stomach. ? Shortness of breath with or without chest discomfort. ? Other signs may include breaking out in a cold sweat, nausea, or lightheadedness. Don't wait more than five minutes to call [address]! Fast action can save your life. Calling 911 is almost always the fastest way to get lifesaving treatment.  Emergency Medical Services staff can begin treatment when they arrive  up to an hour sooner than if someone gets to the hospital by car. Patient armband removed and shredded The discharge information has been reviewed with the patient and caregiver. The patient and caregiver verbalized understanding. Discharge medications reviewed with the patient and caregiver and appropriate educational materials and side effects teaching were provided. ___________________________________________________________________________________________________________________________________ Introducing [facility]! Vanita Nieto introduces [employer] patient portal. Now you can access parts of your medical record, email your doctor's office, and request medication refills online. 1. In your internet browser, go to [email or web address] 2. Click on the First Time User? Click Here link in the Sign In box. You will see the New Member Sign Up page. 3. Enter your [employer] Access Code exactly as it appears below. You will not need to use this code after youve completed the sign-up process. If you do not sign up before the expiration date, you must request a new code. · [employer] Access Code: [email or web address] Expires: 12/25/2017 12:17 PM 
 
4. Enter the last four digits of your Social Security Number (xxxx) and Date of Birth (mm/dd/yyyy) as indicated and click Submit. You will be taken to the next sign-up page. 5. Create a [employer] ID. This will be your [employer] login ID and cannot be changed, so think of one that is secure and easy to remember. 6. Create a [employer] password. You can change your password at any time. 7. Enter your Password Reset Question and Answer. This can be used at a later time if you forget your password. 8. Enter your e-mail address. You will receive e-mail notification when new information is available in [address] 9. Click Sign Up. You can now view and download portions of your medical record. 10. Click the Download Summary menu link to download a portable copy of your medical information. If you have questions, please visit the Frequently Asked Questions section of the [employer] website. Remember, [place] Self [employer] Document Preparation is NOT to be used for urgent needs. For medical emergencies, dial 911. Now available from your iPhone and Android! Providers Seen During Your Hospitalization Provider Specialty Primary office phone Regis Salcedo MD General Surgery [phone] Your Primary Care Physician (PCP) Primary Care Physician Office Phone Office Fax Sukhwinder Robles [phone] You are allergic to the following No active allergies Recent Documentation Height Weight BMI Smoking Status 1.778 m 114.5 kg 36.23 kg/m2 Never Smoker Emergency Contacts Name Discharge Info Relation Home Work Mobile [address] [place] CAREGIVER [3] Spouse [3] [phone] Patient Belongings The following personal items are in your possession at time of discharge: 
  Dental Appliances: None  Visual Aid: Glasses, With patient      Home Medications: None   Jewelry: None  Clothing: Footwear, Shirt, Socks, [employer], Pants, Undergarments (locker 5)    Other Valuables: Eyeglasses (given to spouse) Please provide this summary of care documentation to your next provider. Signatures-by signing, you are acknowledging that this After Visit Summary has been reviewed with you and you have received a copy. Patient Signature:  ____________________________________________________________ Date:  ____________________________________________________________  
  
[employer] Provider Signature:  ____________________________________________________________ Date:  ____________________________________________________________

## 2017-10-25 NOTE — ANESTHESIA POSTPROCEDURE EVALUATION
Post-Anesthesia Evaluation and Assessment    Cardiovascular Function/Vital Signs  Visit Vitals    /67    Pulse 63    Temp 36.7 °C (98.1 °F)    Resp 16    Ht 5' 10\" (1.778 m)    Wt 114.5 kg (252 lb 8 oz)    SpO2 96%    BMI 36.23 kg/m2       Patient is status post Procedure(s):  LAPAROSCOPIC REPAIR INCARCERATED UMBILICAL HERNIA. Nausea/Vomiting: Controlled. Postoperative hydration reviewed and adequate. Pain:  Pain Scale 1: Numeric (0 - 10) (10/25/17 1331)  Pain Intensity 1: 0 (10/25/17 1331)   Managed. Neurological Status:   Neuro (WDL): Within Defined Limits (10/25/17 1255)   At baseline. Mental Status and Level of Consciousness: Baseline and stable. Pulmonary Status:   O2 Device: Nasal cannula (10/25/17 1321)   Adequate oxygenation and airway patent. Complications related to anesthesia: None    Post-anesthesia assessment completed. No concerns. Patient has met all discharge requirements.     Signed By: Keaton Willams MD

## 2017-10-25 NOTE — BRIEF OP NOTE
BRIEF OPERATIVE NOTE    Date of Procedure: 10/25/2017   Preoperative Diagnosis: INCARCERATED UMBILICAL HERNIA  Postoperative Diagnosis: INCARCERATED UMBILICAL HERNIA    Procedure(s):  LAPAROSCOPIC REPAIR INCARCERATED UMBILICAL HERNIA  Surgeon(s) and Role:     * Funmilayo Burr MD - Primary         Assistant Staff:       Surgical Staff:  Circ-1: Joe Begum  Circ-Relief: Jeronimo Brown RN  Scrub Tech-1: Colon Laity  Scrub Tech-Relief: Luis Sierras  Surg Asst-1: Shonna Bergen  Event Time In   Incision Start 1204   Incision Close      Anesthesia: General   Estimated Blood Loss: min  Specimens: * No specimens in log *   Findings: inc umb hernia   Complications: none  Implants:   Implant Name Type Inv.  Item Serial No.  Lot No. LRB No. Used Action   MESH SANDI COMP RND 12CM -- SYMBOTEX - [id number]   MESH SANDI COMP RND 12CM -- SYMBOTEX   [facility] [id number] N/A 1 Implanted

## 2017-10-31 NOTE — OP NOTES
[facility]  [id number]  1955  [id number]  10/25/2017  10/25/2017     PREOPERATIVE DIAGNOSIS: INCARCERATED UMBILICAL HERNIA     POST-OPERATOVE DIAGNOSIS:   INCARCERATED UMBILICAL HERNIA     PROCEDURES PERFORMED: Procedure(s):  LAPAROSCOPIC REPAIR INCARCERATED UMBILICAL HERNIA     SURGEON: Flaquito Castillo MD    ANESTHESIA: General     ESTIMATED BLOOD LOSS: min    SPECIMENS: * No specimens in log *        INDICATIONS: This is a Male who presents with umbilical hernia. DESCRIPTION OF PROCEDURE:    He was placed in supine position, the abdomen was prepped and draped in the usual fashion. A veress needle was inserted in the left upper quadrant using 1 drop technique. A pneumoperitoneum was established. A 5 mm optiview trocar was placed laterally in the abdomen on the left side. A 10 mm port was placed in the left upper quadrant laterally and another 5 mm port placed in the left lower quadrant laterally. DESCRIPTION OF PROCEDURE: He was placed in the supine  position. After adequate sedation was achieved, his abdomen was  prepped and draped in the usual fashion. A Veress needle was  inserted into the left upper quadrant using 1 drop technique and  pneumoperitoneum was established. A 5 mm Optiview trocar was  placed in the left lateral abdomen under visualization. Next, a 10 mm  port was placed in the left upper quadrant laterally. Another 5 mm port  was placed in the left lower quadrant laterally. The abdomen was  inspected and the patient was found to have an umbilical ventral hernia  containing incarcerated preperitoneal fat. The hernia contents and sac were  reduced using laparoscopic graspers. The preperitoneal fat and  sac were excised using the ultrasonic dissector. Once this was done,  the hernia was sized and the appropriate size mesh was placed intra-  abdominally. The defect was closed with a 1-0 PDS suture using a suture passer. The repair was done using Symbotex 12 cm mesh.    The mesh was attached to the abdominal wall using the  [employer] ReliaTack in a double crown technique. The mesh and repair were  inspected. There was good hemostasis. The mesh appeared to be in  good position. A bilateral TAP block was done using 0.25% Marcaine  bilaterally. All ports were removed. The fascial incision at the 10 mm  port was closed using a 0 Vicryl suture. Marcaine 0.25% was injected  locally at the trocar sites. The skin incisions were closed with 4-0  Monocryl subcuticular closure and Dermabond. All sponge and needle  counts were correct. The patient was extubated and taken to recovery  room in stable condition. The patient tolerated the procedure well.       Rylie Gonzales MD  10/31/2017  12:50 PM

## 2019-07-19 NOTE — BRIEF OP NOTE
BRIEF OPERATIVE NOTE    Date of Procedure: 10/13/2017   Preoperative Diagnosis: SCREEN, + FH  Postoperative Diagnosis: DIVERTICULOSIS, POLYP    Procedure(s):  COLONOSCOP, POLYPECTOMY  Surgeon(s) and Role:     * Stephany Reagan MD - Primary         Assistant Staff:       Surgical Staff:  Endoscopy Torie Saldana: Cesario Lo CNA  Endoscopy RN-1: Jah Oliver RN  No case tracking events are documented in the log.   Anesthesia: Con-Sed   Estimated Blood Loss: min  Specimens:   ID Type Source Tests Collected by Time Destination   1 : POLYPECTOMY Preservative Sigmoid  Stephany Reagan MD 10/13/2017 1240 Pathology      Findings: polyp/tics   Complications: none  Implants: * No implants in log * Left message for member with contact information regarding complex case management services.  Plan: Will call member on 7/26/19  If no response at outreach sooner by member.  Hannah Alvarez RN [facility] Complex Case Manager

## (undated) DEVICE — CANNULA CUSH AD W/ 14FT TBG

## (undated) DEVICE — SUT MONOCRYL PLUS UD 4-0 --

## (undated) DEVICE — SUTURE PDS II SZ 1 L27IN ABSRB VLT CT-1 L36MM 1/2 CIR Z341H

## (undated) DEVICE — SET ADMIN 16ML TBNG L100IN 2 Y INJ SITE IV PIGGY BK DISP

## (undated) DEVICE — SUT VCRL + 0 36IN UR6 VIO --

## (undated) DEVICE — SPONGE GZ W4XL4IN COT 12 PLY TYP VII WVN C FLD DSGN

## (undated) DEVICE — KENDALL RADIOLUCENT FOAM MONITORING ELECTRODE RECTANGULAR SHAPE: Brand: KENDALL

## (undated) DEVICE — SPONGE GZ W4XL4IN RAYON POLY 4 PLY NONWOVEN FASTER WICKING

## (undated) DEVICE — TROCAR ENDOSCP L100MM DIA12MM STBL SL BLDELSS ENDOPATH XCEL

## (undated) DEVICE — CORDLESS ULTRASONIC DISSECTOR: Brand: SONICISION

## (undated) DEVICE — VISUALIZATION SYSTEM: Brand: CLEARIFY

## (undated) DEVICE — WRISTBAND ID AD W2.5XL9.5CM RED VYN ADH CLSR UNI-PRINT

## (undated) DEVICE — ERBE NESSY®PLATE 170 SPLIT; 168CM²; CABLE 3M: Brand: ERBE

## (undated) DEVICE — KENDALL SCD EXPRESS SLEEVES, KNEE LENGTH, MEDIUM: Brand: KENDALL SCD

## (undated) DEVICE — Device

## (undated) DEVICE — NEEDLE INSUF L120MM DIA2MM DISP FOR PNEUMOPERI ENDOPATH

## (undated) DEVICE — MEDI-VAC NON-CONDUCTIVE SUCTION TUBING: Brand: CARDINAL HEALTH

## (undated) DEVICE — TRNQT TEXT 1X18IN BLU LF DISP -- CONVERT TO ITEM 362165

## (undated) DEVICE — SUT PROL 2-0 30IN CT1 BLU --

## (undated) DEVICE — NDL PRT INJ NSAF BLNT 18GX1.5 --

## (undated) DEVICE — [HIGH FLOW INSUFFLATOR,  DO NOT USE IF PACKAGE IS DAMAGED,  KEEP DRY,  KEEP AWAY FROM SUNLIGHT,  PROTECT FROM HEAT AND RADIOACTIVE SOURCES.]: Brand: PNEUMOSURE

## (undated) DEVICE — SLEEVE TRCR L100MM DIA5MM UNIV STBL FOR BLDELSS DIL TIP

## (undated) DEVICE — DEVON™ KNEE AND BODY STRAP 60" X 3" (1.5 M X 7.6 CM): Brand: DEVON

## (undated) DEVICE — TRAP SPEC COLL POLYP POLYSTYR --

## (undated) DEVICE — SNARE POLYP SM W13MMXL240CM SHTH DIA2.4MM OVL FLX DISP

## (undated) DEVICE — DEVICE FIX ARTC RELDABLE W/ 1 HNDL 3 10 TACK STD PURCH RELD

## (undated) DEVICE — SYR 3ML LL TIP 1/10ML GRAD --

## (undated) DEVICE — HEX-LOCKING BLADE ELECTRODE: Brand: EDGE

## (undated) DEVICE — NDL FLTR TIP 5 MIC 18GX1.5IN --

## (undated) DEVICE — CATH SUC CTRL PRT TRIFLO 14FR --

## (undated) DEVICE — STERILE POLYISOPRENE POWDER-FREE SURGICAL GLOVES: Brand: PROTEXIS

## (undated) DEVICE — TROCAR LAP L100MM DIA5MM BLDELSS W/ STBL SL ENDOPATH XCEL

## (undated) DEVICE — MAJ-1414 SINGLE USE ADPATER BIOPSY VALV: Brand: SINGLE USE ADAPTOR BIOPSY VALVE

## (undated) DEVICE — SINGLE PORT MANIFOLD: Brand: NEPTUNE 2

## (undated) DEVICE — STERILE POLYISOPRENE POWDER-FREE SURGICAL GLOVES WITH EMOLLIENT COATING: Brand: PROTEXIS

## (undated) DEVICE — ENDO CARRY-ON PROCEDURE KIT INCLUDES ENZYMATIC SPONGE, GAUZE, BIOHAZARD LABEL, TRAY, LUBRICANT, DIRTY SCOPE LABEL, WATER LABEL, TRAY, DRAWSTRING PAD, AND DEFENDO 4-PIECE KIT.: Brand: ENDO CARRY-ON PROCEDURE KIT

## (undated) DEVICE — SYR 5ML 1/5 GRAD LL NSAF LF --

## (undated) DEVICE — CATH IV SAFE STR 22GX1IN BLU -- PROTECTIV PLUS

## (undated) DEVICE — DERMABOND SKIN ADH 0.7ML -- DERMABOND ADVANCED 12/BX

## (undated) DEVICE — SYR 10ML CTRL LR LCK NSAF LF --

## (undated) DEVICE — SOLUTION IV 500ML 0.9% SOD CHL FLX CONT

## (undated) DEVICE — SYRINGE 50ML E/T